# Patient Record
Sex: MALE | Race: OTHER | NOT HISPANIC OR LATINO
[De-identification: names, ages, dates, MRNs, and addresses within clinical notes are randomized per-mention and may not be internally consistent; named-entity substitution may affect disease eponyms.]

---

## 2017-02-23 ENCOUNTER — APPOINTMENT (OUTPATIENT)
Dept: ULTRASOUND IMAGING | Facility: HOSPITAL | Age: 37
End: 2017-02-23

## 2017-02-23 ENCOUNTER — OUTPATIENT (OUTPATIENT)
Dept: OUTPATIENT SERVICES | Facility: HOSPITAL | Age: 37
LOS: 1 days | End: 2017-02-23
Payer: COMMERCIAL

## 2017-02-23 PROCEDURE — 93971 EXTREMITY STUDY: CPT

## 2017-06-09 ENCOUNTER — OUTPATIENT (OUTPATIENT)
Dept: OUTPATIENT SERVICES | Facility: HOSPITAL | Age: 37
LOS: 1 days | End: 2017-06-09
Payer: COMMERCIAL

## 2017-06-09 ENCOUNTER — APPOINTMENT (OUTPATIENT)
Age: 37
End: 2017-06-09

## 2017-06-09 DIAGNOSIS — M54.16 RADICULOPATHY, LUMBAR REGION: ICD-10-CM

## 2017-06-09 PROCEDURE — 64483 NJX AA&/STRD TFRM EPI L/S 1: CPT

## 2019-02-25 ENCOUNTER — APPOINTMENT (OUTPATIENT)
Dept: ORTHOPEDIC SURGERY | Facility: CLINIC | Age: 39
End: 2019-02-25
Payer: COMMERCIAL

## 2019-02-25 VITALS
WEIGHT: 170 LBS | BODY MASS INDEX: 28.32 KG/M2 | HEART RATE: 111 BPM | SYSTOLIC BLOOD PRESSURE: 146 MMHG | HEIGHT: 65 IN | DIASTOLIC BLOOD PRESSURE: 92 MMHG

## 2019-02-25 DIAGNOSIS — Z78.9 OTHER SPECIFIED HEALTH STATUS: ICD-10-CM

## 2019-02-25 DIAGNOSIS — Z80.3 FAMILY HISTORY OF MALIGNANT NEOPLASM OF BREAST: ICD-10-CM

## 2019-02-25 DIAGNOSIS — M25.561 PAIN IN RIGHT KNEE: ICD-10-CM

## 2019-02-25 PROCEDURE — 73564 X-RAY EXAM KNEE 4 OR MORE: CPT | Mod: RT

## 2019-02-25 PROCEDURE — 99204 OFFICE O/P NEW MOD 45 MIN: CPT

## 2020-12-09 ENCOUNTER — APPOINTMENT (OUTPATIENT)
Dept: PSYCHIATRY | Facility: CLINIC | Age: 40
End: 2020-12-09
Payer: COMMERCIAL

## 2020-12-09 PROCEDURE — 90792 PSYCH DIAG EVAL W/MED SRVCS: CPT

## 2020-12-09 PROCEDURE — 99072 ADDL SUPL MATRL&STAF TM PHE: CPT

## 2021-02-14 ENCOUNTER — TRANSCRIPTION ENCOUNTER (OUTPATIENT)
Age: 41
End: 2021-02-14

## 2021-02-18 ENCOUNTER — APPOINTMENT (OUTPATIENT)
Dept: PSYCHIATRY | Facility: CLINIC | Age: 41
End: 2021-02-18

## 2021-04-20 ENCOUNTER — RX RENEWAL (OUTPATIENT)
Age: 41
End: 2021-04-20

## 2021-05-24 ENCOUNTER — APPOINTMENT (OUTPATIENT)
Dept: PSYCHIATRY | Facility: CLINIC | Age: 41
End: 2021-05-24
Payer: COMMERCIAL

## 2021-05-24 PROCEDURE — 99214 OFFICE O/P EST MOD 30 MIN: CPT

## 2021-06-17 ENCOUNTER — EMERGENCY (EMERGENCY)
Facility: HOSPITAL | Age: 41
LOS: 1 days | Discharge: HOME | End: 2021-06-17
Attending: EMERGENCY MEDICINE | Admitting: EMERGENCY MEDICINE
Payer: SELF-PAY

## 2021-06-17 VITALS
WEIGHT: 175.05 LBS | OXYGEN SATURATION: 99 % | HEIGHT: 66 IN | TEMPERATURE: 98 F | RESPIRATION RATE: 18 BRPM | HEART RATE: 79 BPM | DIASTOLIC BLOOD PRESSURE: 86 MMHG | SYSTOLIC BLOOD PRESSURE: 123 MMHG

## 2021-06-17 PROCEDURE — 99284 EMERGENCY DEPT VISIT MOD MDM: CPT

## 2021-06-17 PROCEDURE — 99283 EMERGENCY DEPT VISIT LOW MDM: CPT | Mod: 25

## 2021-06-17 PROCEDURE — 96372 THER/PROPH/DIAG INJ SC/IM: CPT

## 2021-06-17 RX ORDER — LIDOCAINE 4 G/100G
1 CREAM TOPICAL
Qty: 7 | Refills: 0
Start: 2021-06-17 | End: 2021-06-23

## 2021-06-17 RX ORDER — KETOROLAC TROMETHAMINE 30 MG/ML
30 SYRINGE (ML) INJECTION ONCE
Refills: 0 | Status: DISCONTINUED | OUTPATIENT
Start: 2021-06-17 | End: 2021-06-17

## 2021-06-17 RX ORDER — OXYCODONE AND ACETAMINOPHEN 5; 325 MG/1; MG/1
1 TABLET ORAL
Qty: 16 | Refills: 0
Start: 2021-06-17 | End: 2021-06-20

## 2021-06-17 RX ORDER — OXYCODONE AND ACETAMINOPHEN 5; 325 MG/1; MG/1
1 TABLET ORAL ONCE
Refills: 0 | Status: DISCONTINUED | OUTPATIENT
Start: 2021-06-17 | End: 2021-06-17

## 2021-06-17 RX ORDER — METHOCARBAMOL 500 MG/1
1 TABLET, FILM COATED ORAL
Qty: 15 | Refills: 0
Start: 2021-06-17 | End: 2021-06-21

## 2021-06-17 RX ORDER — LIDOCAINE 4 G/100G
1 CREAM TOPICAL ONCE
Refills: 0 | Status: COMPLETED | OUTPATIENT
Start: 2021-06-17 | End: 2021-06-17

## 2021-06-17 RX ORDER — METHOCARBAMOL 500 MG/1
750 TABLET, FILM COATED ORAL ONCE
Refills: 0 | Status: COMPLETED | OUTPATIENT
Start: 2021-06-17 | End: 2021-06-17

## 2021-06-17 RX ADMIN — OXYCODONE AND ACETAMINOPHEN 1 TABLET(S): 5; 325 TABLET ORAL at 08:44

## 2021-06-17 RX ADMIN — Medication 30 MILLIGRAM(S): at 08:18

## 2021-06-17 RX ADMIN — Medication 30 MILLIGRAM(S): at 08:44

## 2021-06-17 RX ADMIN — METHOCARBAMOL 750 MILLIGRAM(S): 500 TABLET, FILM COATED ORAL at 08:18

## 2021-06-17 RX ADMIN — LIDOCAINE 1 PATCH: 4 CREAM TOPICAL at 08:18

## 2021-06-17 RX ADMIN — OXYCODONE AND ACETAMINOPHEN 1 TABLET(S): 5; 325 TABLET ORAL at 08:18

## 2021-06-17 NOTE — ED PROVIDER NOTE - CARE PROVIDER_API CALL
Renato Dunne)  Orthopaedic Sports Medicine; Orthopaedic Surgery  [address] [address]  Phone: [phone]  Fax: [phone]  Follow Up Time:

## 2021-06-17 NOTE — ED PROVIDER NOTE - PATIENT PORTAL LINK FT
You can access the FollowMyHealth Patient Portal offered by [facility] by registering at the following website: [email or web address]. By joining [employer]’s FollowMyHealth portal, you will also be able to view your health information using other applications (apps) compatible with our system.

## 2021-06-17 NOTE — ED PROVIDER NOTE - CLINICAL SUMMARY MEDICAL DECISION MAKING FREE TEXT BOX
41M c/o lower back pain. Pt denies trauma/fall. He has h/o sacroiliitis for which he was followed by pain management and given epidurals. It helped. He states that miraculously, the pain went away and hasn't bothered him for a very long time. Now, he reports pain to the same areas. He happened to have pain meds from a recent dental procedure that he took to help it but it didn't work. No trauma or heavy lifting reported. Ambulatory. No weakness or loss of sensation. There is some radiation down the posterior left leg. No incontinence or stool or urine.   Exam as stated. Plan for antiinflamm, muscl relaxant, and pain control prn. Pt will f/u outpt with ortho. Worsening, continued or ANY new concerning symptoms return to the emergency department.

## 2021-06-17 NOTE — ED PROVIDER NOTE - NSFOLLOWUPINSTRUCTIONS_ED_ALL_ED_FT
Back Pain    Back pain is very common in adults. The cause of back pain is rarely dangerous and the pain often gets better over time. The cause of your back pain may not be known and may include strain of muscles or ligaments, degeneration of the spinal disks, or arthritis. Occasionally the pain may radiate down your leg(s). Over-the-counter medicines to reduce pain and inflammation are often the most helpful. Stretching and remaining active frequently helps the healing process.     Please follow up with an orthopedist. We will recommend one for you.     Please take Ibuprofen (over the counter) 800mg every 8 hours with food as needed for pain (each tablet over the counter is 200mg)    Take Methocarbamol 750mg every 8 hours as needed (great for muscle tightness/relaxation)    Take Prednisone 40mg once each day for 7 days     If severe, take Percocet every 8 hours as needed for severe pain. No driving.       SEEK IMMEDIATE MEDICAL CARE IF YOU HAVE ANY OF THE FOLLOWING SYMPTOMS: bowel or bladder control problems, unusual weakness or numbness in your arms or legs, nausea or vomiting, abdominal pain, fever, dizziness/lightheadedness.

## 2021-06-17 NOTE — ED PROVIDER NOTE - OBJECTIVE STATEMENT
41M c/o lower back pain. Pt denies trauma/fall. He has h/o sacroiliitis for which he was followed by pain management and given epidurals. It helped. He states that miraculously, the pain went away and hasn't bothered him for a very long time. Now, he reports pain to the same areas. He happened to have pain meds from a recent dental procedure that he took to help it but it didn't work. No trauma or heavy lifting reported. Ambulatory. No weakness or loss of sensation. There is some radiation down the posterior left leg. No incontinence or stool or urine.

## 2021-06-18 PROBLEM — M54.9 DORSALGIA, UNSPECIFIED: Chronic | Status: ACTIVE | Noted: 2021-06-17

## 2021-06-24 ENCOUNTER — APPOINTMENT (OUTPATIENT)
Dept: ORTHOPEDIC SURGERY | Facility: CLINIC | Age: 41
End: 2021-06-24
Payer: COMMERCIAL

## 2021-06-24 VITALS — HEART RATE: 97 BPM | DIASTOLIC BLOOD PRESSURE: 79 MMHG | SYSTOLIC BLOOD PRESSURE: 124 MMHG

## 2021-06-24 LAB
BASOPHILS # BLD AUTO: 0.03 K/UL
BASOPHILS NFR BLD AUTO: 0.3 %
EOSINOPHIL # BLD AUTO: 0.06 K/UL
EOSINOPHIL NFR BLD AUTO: 0.6 %
HCT VFR BLD CALC: 44.1 %
HGB BLD-MCNC: 14.5 G/DL
IMM GRANULOCYTES NFR BLD AUTO: 1.8 %
LYMPHOCYTES # BLD AUTO: 2.19 K/UL
LYMPHOCYTES NFR BLD AUTO: 21.4 %
MAN DIFF?: NORMAL
MCHC RBC-ENTMCNC: 30.3 PG
MCHC RBC-ENTMCNC: 32.9 GM/DL
MCV RBC AUTO: 92.1 FL
MONOCYTES # BLD AUTO: 0.68 K/UL
MONOCYTES NFR BLD AUTO: 6.6 %
NEUTROPHILS # BLD AUTO: 7.1 K/UL
NEUTROPHILS NFR BLD AUTO: 69.3 %
PLATELET # BLD AUTO: 344 K/UL
RBC # BLD: 4.79 M/UL
RBC # FLD: 11.9 %
WBC # FLD AUTO: 10.24 K/UL

## 2021-06-24 PROCEDURE — 99214 OFFICE O/P EST MOD 30 MIN: CPT

## 2021-06-24 PROCEDURE — 72100 X-RAY EXAM L-S SPINE 2/3 VWS: CPT

## 2021-06-24 NOTE — HISTORY OF PRESENT ILLNESS
[de-identified] : Mr. WISEMAN  is a 41 year male  presenting to the office complaining of low back pain. He  presents to the office ambulating independently. Patient reports pain intermittently for years.  Denies injury or trauma to the area.  The patient describes the pain as a dull aching, and occasionally sharp pain localized to the lumbar region that is intermittent in nature.  His symptoms are exacerbated with strenuous activities, prolonged standing and sitting.  Pain is alleviated with rest. Patient denies radicular symptoms. Patient notes the worst pain is at night and will wake him up at times. Patient denies new weakness, numbness or paresthesia.  Patient denies bowel/bladder dysfunction, fevers, chills, weight loss, night pain, or night sweats. Patient notes he had ESTELLA injections in the past with minimal relief in symptoms. Patient is taking NSAIDs for pain relief with mild to moderate relief in symptoms. He presents today with a MRI from [facility]. Patient denies any other complaints at this time. \par

## 2021-06-24 NOTE — DISCUSSION/SUMMARY
[de-identified] : The underlying pathophysiology was reviewed in great detail with the patient as well as the various treatment options, including ice, analgesics, NSAIDs, Physical therapy, steroid injections, ESTELLA rheumatology referral. \par \par MRI of the lumbar spine from 2017 was reviewed and discussed in great detail today. \par \par A prescription was provided for a MRI of the lumbar spine to rule out HNP.\par \par A prescription for Physical Therapy was provided.\par \par  A prescription for lab work was provided to rule out an inflammatory disease process.\par \par a prescription was provided for Indocin, and Skelaxin today. \par \par Activity modifications and restrictions were discussed.\par \par FU 6 weeks. \par \par All questions were answered, all alternatives discussed and the patient is in complete agreement with that plan. Follow-up appointment as instructed. Any issues and the patient will call or come in sooner.

## 2021-06-24 NOTE — CONSULT LETTER
[Dear  ___] : Dear  [unfilled], [Consult Letter:] : I had the pleasure of evaluating your patient, [unfilled]. [Please see my note below.] : Please see my note below. [Consult Closing:] : Thank you very much for allowing me to participate in the care of this patient.  If you have any questions, please do not hesitate to contact me. [Sincerely,] : Sincerely, [FreeTextEntry3] : Dr. Renato Dunne \par \par

## 2021-06-24 NOTE — PHYSICAL EXAM
[de-identified] : Lumbosacral Spine:\par Musculoskeletal Examination\par ¦ Inspection : no visible rash, no deformities\par ¦ Palpation : L mid lumbar spine tenderness to palpation, left SI joint tenderness to palpation and Left > right paraspinal musculature tenderness to palpation with palpable tightness. \par ¦ Stability : no subluxations present\par ¦ Range of Motion : full and painful arc of motion in all planes\par ¦ Muscle Strength : paraspinal muscle strength and tone within normal limits\par ¦ Muscle Tone : paraspinal muscle strength and tone within normal limits\par ¦ Tests/Signs : Straight Leg Raise Test (-) bilaterally. Patellar and Achilles Reflexes (2+) bilaterally.\par ¦ Lower Extremity Muscle Strength : hip flexion 5/5, knee flexion 5/5, ankle dorsiflexion 5/5, plantar flexion 5/5, EHL 5/5\par \par   [de-identified] : o Lumbosacral Spine : AP and lateral views were obtained, there are no soft tissue abnormalities, no fractures, Grade I spondylolisthesis L5/S1,  normal appearing disc spaces and foraminae, normal bone density, no bony lesions.\par \par \par Patient comes to today's visit with outside imaging already performed. I reviewed the images in detail with the patient and discussed the findings as highlighted below.\par \par o MRI of the lumbar spine performed at [facility] on 4/30/2017\par ¦ There is mild straightening of the normal lumbar lordosis. Vertebral bodies maintain normal height and signal. Intervertebral disc spaces are unremarkable with normal height and signal. The distal cord and conus are within normal limits. The paravertebral soft tissues are unremarkable.\par \par ¦ EVALUATION OF INDIVIDUAL LEVELS DEMONSTRATES: \par \par L1-2: No disc herniation spinal canal stenosis or neural foraminal narrowing.\par \par L2-3: No disc herniation spinal canal stenosis or neural foraminal narrowing.\par \par L3-4: No disc herniation spinal canal stenosis or neural foraminal narrowing.\par \par L4-5: There is a cystic structure seen along the inferior posterior aspect of the right facet joint compatible with extraspinal synovial cyst. There is mild facet hypertrophy at this level. No focal disc herniations are seen. No focal disc herniation spinal canal stenosis or significant foraminal stenosis.\par \par L5-S1: No disc herniation spinal canal stenosis or neural foraminal narrowing\par  \par \par Impression: \par \par Mild straightening of lumbar lordosis. Mild facet hypertrophy L4-5 level with extraspinal synovial cyst seen on right. \par \par

## 2021-06-25 ENCOUNTER — NON-APPOINTMENT (OUTPATIENT)
Age: 41
End: 2021-06-25

## 2021-06-25 LAB
CRP SERPL-MCNC: <3 MG/L
ERYTHROCYTE [SEDIMENTATION RATE] IN BLOOD BY WESTERGREN METHOD: 9 MM/HR
RHEUMATOID FACT SER QL: <10 IU/ML

## 2021-06-28 ENCOUNTER — NON-APPOINTMENT (OUTPATIENT)
Age: 41
End: 2021-06-28

## 2021-06-28 LAB
ANA SER IF-ACNC: NEGATIVE
B BURGDOR AB SER-IMP: NEGATIVE
B BURGDOR IGM PATRN SER IB-IMP: NEGATIVE
B BURGDOR18KD IGG SER QL IB: NORMAL
B BURGDOR23KD IGG SER QL IB: NORMAL
B BURGDOR23KD IGM SER QL IB: NORMAL
B BURGDOR28KD IGG SER QL IB: NORMAL
B BURGDOR30KD IGG SER QL IB: PRESENT
B BURGDOR31KD IGG SER QL IB: NORMAL
B BURGDOR39KD IGG SER QL IB: NORMAL
B BURGDOR39KD IGM SER QL IB: NORMAL
B BURGDOR41KD IGG SER QL IB: PRESENT
B BURGDOR41KD IGM SER QL IB: PRESENT
B BURGDOR45KD IGG SER QL IB: NORMAL
B BURGDOR58KD IGG SER QL IB: NORMAL
B BURGDOR66KD IGG SER QL IB: PRESENT
B BURGDOR93KD IGG SER QL IB: NORMAL

## 2021-07-05 ENCOUNTER — APPOINTMENT (OUTPATIENT)
Dept: MRI IMAGING | Facility: HOSPITAL | Age: 41
End: 2021-07-05
Payer: COMMERCIAL

## 2021-07-05 ENCOUNTER — OUTPATIENT (OUTPATIENT)
Dept: OUTPATIENT SERVICES | Facility: HOSPITAL | Age: 41
LOS: 1 days | End: 2021-07-05
Payer: COMMERCIAL

## 2021-07-05 DIAGNOSIS — M43.17 SPONDYLOLISTHESIS, LUMBOSACRAL REGION: ICD-10-CM

## 2021-07-05 PROCEDURE — 72148 MRI LUMBAR SPINE W/O DYE: CPT | Mod: 26

## 2021-07-05 PROCEDURE — 72148 MRI LUMBAR SPINE W/O DYE: CPT

## 2021-07-06 ENCOUNTER — TRANSCRIPTION ENCOUNTER (OUTPATIENT)
Age: 41
End: 2021-07-06

## 2021-07-07 ENCOUNTER — NON-APPOINTMENT (OUTPATIENT)
Age: 41
End: 2021-07-07

## 2021-07-07 LAB — HLA-B27 RELATED AG QL: NEGATIVE

## 2021-07-09 ENCOUNTER — NON-APPOINTMENT (OUTPATIENT)
Age: 41
End: 2021-07-09

## 2021-07-16 ENCOUNTER — RX RENEWAL (OUTPATIENT)
Age: 41
End: 2021-07-16

## 2021-07-27 ENCOUNTER — TRANSCRIPTION ENCOUNTER (OUTPATIENT)
Age: 41
End: 2021-07-27

## 2021-07-27 ENCOUNTER — RX RENEWAL (OUTPATIENT)
Age: 41
End: 2021-07-27

## 2021-08-09 ENCOUNTER — APPOINTMENT (OUTPATIENT)
Dept: ORTHOPEDIC SURGERY | Facility: CLINIC | Age: 41
End: 2021-08-09
Payer: COMMERCIAL

## 2021-08-09 DIAGNOSIS — M43.17 SPONDYLOLISTHESIS, LUMBOSACRAL REGION: ICD-10-CM

## 2021-08-09 PROCEDURE — 99214 OFFICE O/P EST MOD 30 MIN: CPT | Mod: NC

## 2021-08-09 NOTE — DISCUSSION/SUMMARY
[de-identified] : The underlying pathophysiology was reviewed in great detail with the patient as well as the various treatment options, including ice, analgesics, NSAIDs, Physical therapy, steroid injections, ESTELLA rheumatology referral. \par \par MRI of the lumbar spine and inflammatory labs were reviewed and discussed in great detail today. \par \par A home exercise sheet was given and discussed with the patient to follow. \par \par A prescription was provided for a Medrol Dose Fam. Patient is to adhere to instructions provided with medication. \par \par A prescription was provided for Gabapentin 100 mg tid. Discussed taking medication 1x a day at bedtime to start. He may titrate up to 100 mg 3x a day. \par \par Activity modifications and restrictions were discussed.\par \par Discussed trial of acupuncture. \par \par FU 6 weeks. \par \par All questions were answered, all alternatives discussed and the patient is in complete agreement with that plan. Follow-up appointment as instructed. Any issues and the patient will call or come in sooner.

## 2021-08-09 NOTE — HISTORY OF PRESENT ILLNESS
[de-identified] : Mr. WISEMAN  is a 41 year male  presenting to the office complaining of low back pain. He presents to the office ambulating independently. Patient reports pain intermittently for years.  Denies injury or trauma to the area.  The patient describes the pain as a dull aching, and occasionally sharp pain localized to the lumbar region that is intermittent in nature.  His symptoms are exacerbated with strenuous activities, prolonged standing and sitting.  Pain is alleviated with rest. Patient denies radicular symptoms. Patient notes the worst pain is at night and will wake him up at times. He notes these symptoms have increased since last visit. Patient denies new weakness, numbness or paresthesia.  Patient denies bowel/bladder dysfunction, fevers, chills, weight loss, night pain, or night sweats. Patient notes he had ESTELLA/ and trigger point injections in the past with minimal relief in symptoms. He notes he has had SI joint injections as well he believes they provided him some good temporary relief. Patient is taking NSAIDs for pain relief with mild to moderate relief in symptoms. He presents today with a MRI from [facility]. Patient was sent for inflammatory labs at his last visit that were WNL.  Patient denies any other complaints at this time. \par

## 2021-08-09 NOTE — PHYSICAL EXAM
[de-identified] : Lumbosacral Spine:\par Musculoskeletal Examination\par ¦ Inspection : no visible rash, no deformities\par ¦ Palpation : L mid lumbar spine tenderness to palpation, marked left SI joint tenderness to palpation and Left > right paraspinal musculature tenderness to palpation with palpable tightness. \par ¦ Stability : no subluxations present\par ¦ Range of Motion : full and painful arc of motion in all planes\par ¦ Muscle Strength : paraspinal muscle strength and tone within normal limits\par ¦ Muscle Tone : paraspinal muscle strength and tone within normal limits\par ¦ Tests/Signs : Straight Leg Raise Test (-) bilaterally. Patellar and Achilles Reflexes (2+) bilaterally.\par ¦ Lower Extremity Muscle Strength : hip flexion 5/5, knee flexion 5/5, ankle dorsiflexion 5/5, plantar flexion 5/5, EHL 5/5\par \par  Right Lower Extremity\par o Hip :\par ¦ Inspection/Palpation : no tenderness, no swelling, no deformity\par ¦ Range of Motion : full and painless in all planes, no crepitus\par ¦ Stability : joint stability intact\par ¦ Strength : hip flexion 5/5\par ¦ Tests and Signs : all tests for stability normal\par o Muscle Tone : tone normal\par o Muscle Bulk : normal muscle bulk present\par o Skin : no erythema, no ecchymosis\par o Sensation : sensation to light touch intact\par o Vascular Exam : no edema, no cyanosis, dorsalis pedis artery pulse 2+, posterior tibial artery pulse 2+\par \par Left Lower Extremity\par o Hip :\par ¦ Inspection/Palpation : no tenderness, no swelling, no deformity\par ¦ Range of Motion : full and painless in all planes, no crepitus\par ¦ Stability : joint stability intact\par ¦ Strength : hip flexion 5/5\par ¦ Tests and Signs : all tests for stability normal\par o Muscle Tone : tone normal\par o Muscle Bulk : normal muscle bulk present\par o Skin : no erythema, no ecchymosis\par o Sensation : sensation to light touch intact\par o Vascular Exam : no edema, no cyanosis, dorsalis pedis artery pulse 2+, posterior tibial artery pulse 2+\par  [de-identified] : Patient comes to today's visit with outside imaging already performed. I reviewed the images in detail with the patient and discussed the findings as highlighted below.\par \par o MRI of the lumbar spine performed at [facility] on 07/05/2021\par \par FINDINGS:\par \par OSSEOUS STRUCTURES\par  Fractures: None.\par  Alignment: Maintained.\par  Marrow Signal: Maintained.\par \par SPINAL CORD\par  Signal: Normal.\par  Conus Medullaris: Terminates at L1.\par \par DISC LEVELS\par T12-L1: Maintained on the sagittal sequences.\par \par L1-L2: Maintained.\par \par L2-L3: Maintained.\par \par L3-L4: Maintained.\par \par L4-L5: Disc is maintained. Small facet cyst is again seen posteriorly on the right. This is mildly increased in size over time.\par \par L5-S1: Mild loss of disc height is present posteriorly without stenosis.\par \par VISUALIZED SACROILIAC JOINTS\par Maintained.\par \par SOFT TISSUES\par Unremarkable.\par \par IMPRESSION:\par 1. Lumbar discs are maintained.\par 2. Small posterior facet cyst is again seen on the right at L4-L5 that is mildly increased in size over time.

## 2021-08-11 ENCOUNTER — APPOINTMENT (OUTPATIENT)
Dept: PSYCHIATRY | Facility: CLINIC | Age: 41
End: 2021-08-11
Payer: COMMERCIAL

## 2021-08-11 DIAGNOSIS — G89.29 SACROCOCCYGEAL DISORDERS, NOT ELSEWHERE CLASSIFIED: ICD-10-CM

## 2021-08-11 DIAGNOSIS — M53.3 SACROCOCCYGEAL DISORDERS, NOT ELSEWHERE CLASSIFIED: ICD-10-CM

## 2021-08-11 PROCEDURE — 99214 OFFICE O/P EST MOD 30 MIN: CPT

## 2021-08-11 RX ORDER — ZOLPIDEM TARTRATE 5 MG/1
5 TABLET ORAL
Qty: 15 | Refills: 1 | Status: DISCONTINUED | COMMUNITY
Start: 2020-12-09 | End: 2021-08-11

## 2021-08-13 ENCOUNTER — TRANSCRIPTION ENCOUNTER (OUTPATIENT)
Age: 41
End: 2021-08-13

## 2021-11-08 ENCOUNTER — APPOINTMENT (OUTPATIENT)
Dept: PSYCHIATRY | Facility: CLINIC | Age: 41
End: 2021-11-08
Payer: COMMERCIAL

## 2021-11-08 PROCEDURE — 99214 OFFICE O/P EST MOD 30 MIN: CPT

## 2021-11-08 RX ORDER — GABAPENTIN 100 MG/1
100 CAPSULE ORAL 3 TIMES DAILY
Qty: 90 | Refills: 0 | Status: DISCONTINUED | COMMUNITY
Start: 2021-08-09 | End: 2021-11-08

## 2021-11-18 ENCOUNTER — NON-APPOINTMENT (OUTPATIENT)
Age: 41
End: 2021-11-18

## 2021-11-18 ENCOUNTER — APPOINTMENT (OUTPATIENT)
Dept: INTERNAL MEDICINE | Facility: CLINIC | Age: 41
End: 2021-11-18
Payer: COMMERCIAL

## 2021-11-18 VITALS
RESPIRATION RATE: 17 BRPM | WEIGHT: 185 LBS | DIASTOLIC BLOOD PRESSURE: 78 MMHG | BODY MASS INDEX: 30.82 KG/M2 | SYSTOLIC BLOOD PRESSURE: 116 MMHG | HEIGHT: 65 IN | TEMPERATURE: 97.7 F

## 2021-11-18 DIAGNOSIS — L30.9 DERMATITIS, UNSPECIFIED: ICD-10-CM

## 2021-11-18 DIAGNOSIS — Z83.3 FAMILY HISTORY OF DIABETES MELLITUS: ICD-10-CM

## 2021-11-18 PROCEDURE — G0444 DEPRESSION SCREEN ANNUAL: CPT

## 2021-11-18 PROCEDURE — 99204 OFFICE O/P NEW MOD 45 MIN: CPT | Mod: 25

## 2021-11-18 PROCEDURE — G0442 ANNUAL ALCOHOL SCREEN 15 MIN: CPT

## 2021-11-18 RX ORDER — CLOBETASOL PROPIONATE 0.5 MG/G
0.05 CREAM TOPICAL
Qty: 3 | Refills: 3 | Status: ACTIVE | COMMUNITY
Start: 2021-11-18 | End: 1900-01-01

## 2021-11-18 RX ORDER — OXYCODONE 5 MG/1
5 TABLET ORAL
Refills: 0 | Status: DISCONTINUED | COMMUNITY
Start: 2021-06-25 | End: 2021-11-18

## 2021-11-18 NOTE — HEALTH RISK ASSESSMENT
[Very Good] : ~his/her~  mood as very good [Yes] : Yes [Monthly or less (1 pt)] : Monthly or less (1 point) [1 or 2 (0 pts)] : 1 or 2 (0 points) [Never (0 pts)] : Never (0 points) [No] : In the past 12 months have you used drugs other than those required for medical reasons? No [No falls in past year] : Patient reported no falls in the past year [0] : 2) Feeling down, depressed, or hopeless: Not at all (0) [PHQ-2 Negative - No further assessment needed] : PHQ-2 Negative - No further assessment needed [HIV test declined] : HIV test declined [Hepatitis C test declined] : Hepatitis C test declined [None] : None [Employed] : employed [] :  [Sexually Active] : sexually active [Feels Safe at Home] : Feels safe at home [Fully functional (bathing, dressing, toileting, transferring, walking, feeding)] : Fully functional (bathing, dressing, toileting, transferring, walking, feeding) [Fully functional (using the telephone, shopping, preparing meals, housekeeping, doing laundry, using] : Fully functional and needs no help or supervision to perform IADLs (using the telephone, shopping, preparing meals, housekeeping, doing laundry, using transportation, managing medications and managing finances) [] : No [Audit-CScore] : 1 [de-identified] : not active [de-identified] : could be better [[id number]] : 0 [Change in mental status noted] : No change in mental status noted [Language] : denies difficulty with language [Learning/Retaining New Information] : denies difficulty learning/retaining new information [Handling Complex Tasks] : denies difficulty handling complex tasks [Reasoning] : denies difficulty with reasoning [Spatial Ability and Orientation] : denies difficulty with spatial ability and orientation [Reports changes in hearing] : Reports no changes in hearing [Reports changes in vision] : Reports no changes in vision [Reports changes in dental health] : Reports no changes in dental health

## 2021-11-18 NOTE — HISTORY OF PRESENT ILLNESS
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

## 2021-12-06 ENCOUNTER — APPOINTMENT (OUTPATIENT)
Dept: PSYCHIATRY | Facility: CLINIC | Age: 41
End: 2021-12-06
Payer: COMMERCIAL

## 2021-12-06 PROCEDURE — 99214 OFFICE O/P EST MOD 30 MIN: CPT

## 2021-12-08 LAB
BASOPHILS # BLD AUTO: 0.06 K/UL
BASOPHILS NFR BLD AUTO: 0.6 %
EOSINOPHIL # BLD AUTO: 0.23 K/UL
EOSINOPHIL NFR BLD AUTO: 2.4 %
ERYTHROCYTE [SEDIMENTATION RATE] IN BLOOD BY WESTERGREN METHOD: 20 MM/HR
HCT VFR BLD CALC: 45.9 %
HGB BLD-MCNC: 15.1 G/DL
IMM GRANULOCYTES NFR BLD AUTO: 0.8 %
LYMPHOCYTES # BLD AUTO: 2.87 K/UL
LYMPHOCYTES NFR BLD AUTO: 30.5 %
MAN DIFF?: NORMAL
MCHC RBC-ENTMCNC: 29.7 PG
MCHC RBC-ENTMCNC: 32.9 GM/DL
MCV RBC AUTO: 90.2 FL
MONOCYTES # BLD AUTO: 0.76 K/UL
MONOCYTES NFR BLD AUTO: 8.1 %
NEUTROPHILS # BLD AUTO: 5.42 K/UL
NEUTROPHILS NFR BLD AUTO: 57.6 %
PLATELET # BLD AUTO: 391 K/UL
RBC # BLD: 5.09 M/UL
RBC # FLD: 12.1 %
WBC # FLD AUTO: 9.42 K/UL

## 2021-12-09 ENCOUNTER — TRANSCRIPTION ENCOUNTER (OUTPATIENT)
Age: 41
End: 2021-12-09

## 2021-12-09 LAB
25(OH)D3 SERPL-MCNC: 18.1 NG/ML
ALBUMIN SERPL ELPH-MCNC: 4.6 G/DL
ALP BLD-CCNC: 57 U/L
ALT SERPL-CCNC: 18 U/L
ANION GAP SERPL CALC-SCNC: 10 MMOL/L
APPEARANCE: CLEAR
AST SERPL-CCNC: 18 U/L
BACTERIA: NEGATIVE
BILIRUB SERPL-MCNC: 0.7 MG/DL
BILIRUBIN URINE: NEGATIVE
BLOOD URINE: NEGATIVE
BUN SERPL-MCNC: 12 MG/DL
CALCIUM SERPL-MCNC: 9.9 MG/DL
CHLORIDE SERPL-SCNC: 105 MMOL/L
CHOLEST SERPL-MCNC: 140 MG/DL
CO2 SERPL-SCNC: 28 MMOL/L
COLOR: YELLOW
CREAT SERPL-MCNC: 1.01 MG/DL
DEPRECATED D DIMER PPP IA-ACNC: <150 NG/ML DDU
ESTIMATED AVERAGE GLUCOSE: 114 MG/DL
FERRITIN SERPL-MCNC: 158 NG/ML
GLUCOSE QUALITATIVE U: NEGATIVE
GLUCOSE SERPL-MCNC: 103 MG/DL
HBA1C MFR BLD HPLC: 5.6 %
HDLC SERPL-MCNC: 45 MG/DL
HYALINE CASTS: 0 /LPF
KETONES URINE: NEGATIVE
LDLC SERPL CALC-MCNC: 75 MG/DL
LEUKOCYTE ESTERASE URINE: NEGATIVE
MICROSCOPIC-UA: NORMAL
NITRITE URINE: NEGATIVE
NONHDLC SERPL-MCNC: 95 MG/DL
PH URINE: 6
POTASSIUM SERPL-SCNC: 4.5 MMOL/L
PROT SERPL-MCNC: 7.2 G/DL
PROTEIN URINE: NEGATIVE
PSA SERPL-MCNC: 0.84 NG/ML
RED BLOOD CELLS URINE: 1 /HPF
SODIUM SERPL-SCNC: 143 MMOL/L
SPECIFIC GRAVITY URINE: 1.03
SQUAMOUS EPITHELIAL CELLS: 0 /HPF
TRIGL SERPL-MCNC: 100 MG/DL
TSH SERPL-ACNC: 0.31 UIU/ML
UROBILINOGEN URINE: ABNORMAL
WHITE BLOOD CELLS URINE: 0 /HPF

## 2021-12-10 ENCOUNTER — TRANSCRIPTION ENCOUNTER (OUTPATIENT)
Age: 41
End: 2021-12-10

## 2021-12-10 DIAGNOSIS — N52.9 MALE ERECTILE DYSFUNCTION, UNSPECIFIED: ICD-10-CM

## 2022-01-10 ENCOUNTER — APPOINTMENT (OUTPATIENT)
Dept: PSYCHIATRY | Facility: CLINIC | Age: 42
End: 2022-01-10
Payer: COMMERCIAL

## 2022-01-10 PROCEDURE — 99214 OFFICE O/P EST MOD 30 MIN: CPT | Mod: 95

## 2022-01-13 ENCOUNTER — TRANSCRIPTION ENCOUNTER (OUTPATIENT)
Age: 42
End: 2022-01-13

## 2022-01-14 ENCOUNTER — TRANSCRIPTION ENCOUNTER (OUTPATIENT)
Age: 42
End: 2022-01-14

## 2022-01-25 ENCOUNTER — RX RENEWAL (OUTPATIENT)
Age: 42
End: 2022-01-25

## 2022-01-25 RX ORDER — SERTRALINE HYDROCHLORIDE 50 MG/1
50 TABLET, FILM COATED ORAL
Qty: 30 | Refills: 0 | Status: DISCONTINUED | COMMUNITY
Start: 2020-12-09 | End: 2022-01-25

## 2022-02-10 ENCOUNTER — APPOINTMENT (OUTPATIENT)
Dept: PSYCHIATRY | Facility: CLINIC | Age: 42
End: 2022-02-10
Payer: COMMERCIAL

## 2022-02-10 PROCEDURE — 99213 OFFICE O/P EST LOW 20 MIN: CPT | Mod: 95

## 2022-02-10 RX ORDER — BUPROPION HYDROCHLORIDE 300 MG/1
300 TABLET, EXTENDED RELEASE ORAL
Qty: 90 | Refills: 0 | Status: DISCONTINUED | COMMUNITY
Start: 2021-11-08 | End: 2022-02-10

## 2022-05-02 ENCOUNTER — NON-APPOINTMENT (OUTPATIENT)
Age: 42
End: 2022-05-02

## 2022-05-09 ENCOUNTER — APPOINTMENT (OUTPATIENT)
Dept: INTERNAL MEDICINE | Facility: CLINIC | Age: 42
End: 2022-05-09

## 2022-05-14 ENCOUNTER — EMERGENCY (EMERGENCY)
Facility: HOSPITAL | Age: 42
LOS: 1 days | Discharge: HOME | End: 2022-05-14
Attending: EMERGENCY MEDICINE | Admitting: EMERGENCY MEDICINE
Payer: COMMERCIAL

## 2022-05-14 VITALS
SYSTOLIC BLOOD PRESSURE: 128 MMHG | WEIGHT: 193.57 LBS | HEART RATE: 83 BPM | DIASTOLIC BLOOD PRESSURE: 88 MMHG | OXYGEN SATURATION: 96 % | HEIGHT: 66 IN | RESPIRATION RATE: 16 BRPM | TEMPERATURE: 98 F

## 2022-05-14 PROCEDURE — 99284 EMERGENCY DEPT VISIT MOD MDM: CPT

## 2022-05-14 RX ORDER — METHOCARBAMOL 500 MG/1
750 TABLET, FILM COATED ORAL ONCE
Refills: 0 | Status: COMPLETED | OUTPATIENT
Start: 2022-05-14 | End: 2022-05-14

## 2022-05-14 RX ORDER — LIDOCAINE 4 G/100G
1 CREAM TOPICAL ONCE
Refills: 0 | Status: COMPLETED | OUTPATIENT
Start: 2022-05-14 | End: 2022-05-14

## 2022-05-14 RX ORDER — OXYCODONE AND ACETAMINOPHEN 5; 325 MG/1; MG/1
1 TABLET ORAL ONCE
Refills: 0 | Status: DISCONTINUED | OUTPATIENT
Start: 2022-05-14 | End: 2022-05-14

## 2022-05-14 RX ORDER — KETOROLAC TROMETHAMINE 30 MG/ML
30 SYRINGE (ML) INJECTION ONCE
Refills: 0 | Status: DISCONTINUED | OUTPATIENT
Start: 2022-05-14 | End: 2022-05-14

## 2022-05-14 RX ADMIN — METHOCARBAMOL 750 MILLIGRAM(S): 500 TABLET, FILM COATED ORAL at 23:24

## 2022-05-14 RX ADMIN — LIDOCAINE 1 PATCH: 4 CREAM TOPICAL at 23:25

## 2022-05-14 RX ADMIN — Medication 30 MILLIGRAM(S): at 23:24

## 2022-05-14 RX ADMIN — OXYCODONE AND ACETAMINOPHEN 1 TABLET(S): 5; 325 TABLET ORAL at 23:24

## 2022-05-14 NOTE — ED ADULT NURSE NOTE - OBJECTIVE STATEMENT
Patient presents to ED with complaint of lower back pain x several days. Alert and oriented x 4. No signs or symptoms of nausea, vomiting, numbness, dizziness or SOB.

## 2022-05-14 NOTE — ED ADULT TRIAGE NOTE - ARRIVAL INFO ADDITIONAL COMMENTS
Patient presents to ED with 10/10 back pain for one week, worsening the past 3-4 days. Patient with chronic back pain and imaging completed in past, history of DJD. Patient has tried Advil and Gabapentin with little to no affect at home. No numbness or tingling on lower extremities. Pain is 10/10 midline, denies spasms. No incontinence of urine or stool

## 2022-05-14 NOTE — ED ADULT TRIAGE NOTE - STATUS:
Radiotherapy Treatment Summary              PATIENT: Kayleigh Rocha  MEDICAL RECORD NO: [id number]   : 1962    DIAGNOSIS: Esophageal cancer  INTENT OF RADIOTHERAPY: Curative   PATHOLOGY:  Moderately differentiated squamous cell carcinoma                                  STAGE: cT3N1  CONCURRENT SYSTEMIC THERAPY: Carbo-Taxol        ONCOLOGIC HISTORY:  Ms. Rocha is a 58 year old female with a history of smoking and a known diagnosis of locally advanced oral cavity cancer, status post surgery followed by chemoradiation in 2017 and has since been in remission. 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 She as staged as [id number], stage III. She completed chemoradiation with plan for surgical evaluation thereafter.  The patient's course was complicated by treatment emergent PEG placement for nutritional support and admission to the hospital towards the end of treatment for pain management and poor nutrition/ hydration.     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  She received risk adaptive adjuvant chemoradiation to the tumor bed and bilateral necks was was treated to a total dose 6600 cGy in 33 fractions with concurrent cisplatin. This was done in our [facility], and was completed on 17. She has since been followed with clinic visits and repeat imaging studies, and there has been no evidence of disease recurrence.     On 1/10/21, the patient presented to the ED at an outside hospital with acute epigastric pain, vomiting, and inability to tolerate oral intake. A CT C/A/P was obtained as part of workup, which revealed a 4.1 cm  Applied segment of focal thickening in the upper esophagus, worrisome for malignancy. Endoscopic evaluations were performed on 1/11/21 and 1/13/21. Significant findings included a large, fungating, partially circumferential and partially obstructing mass in the middle third of the esophagus, first encountered at 27 cm from the incisors and extended to 32 cm. There was sonographic evidence suggestive of disease invasion into the adventitia. Two peritumoral nodes, 10 mm in maximal dimension, and a well-defined 14 x 6 mm left upper paratracheal node were also noted. Biopsy from the primary esophageal mass returned as moderately differentiated squamous cell carcinoma. FNA of the lymph nodes were negative. She underwent esophageal dilitation and stent placement later on the same day. Staging PET/CT on 1/18/21 re-demonstrated the hypermetabolic esophageal mass. There was also a moderately hypermetabolic soft tissue nodule/lymph node in the left mediastinum along the left paraesophageal region in the inferiolateral aspect of the primary, which may be a local lymph node metastasis.      She discussed her treatment options with Dr. Hidalgo of Medical Oncology on 1/22/21 and has seen Dr. Bingham of Radiation Oncology at the [facility]. The patient is now referred to us as the [facility] is closer to home for her now.       SITE OF TREATMENT: Esophagus-Thorax     DATES  OF TREATMENT: 2/23/21 to 4/16/21    TOTAL DOSE OF TREATMENT: 5000 cGy in 25 fractions    DOSE PER FRACTION OF TREATMENT: 200 cGy       COMMENT/TOXICITY:   Grade 3 esophagitis                   PAIN MANAGEMENT:                           Morphine 45mg BID  Oxycodone oral pill PRN.      FOLLOW UP PLAN:  1. RTC in 2 weeks for acute toxicity check.   2. Follow up with thoracic surgery team to evaluate for surgical candidacy.  CC  Patient Care Team:  Anca Augustin MD as PCP - General (Family Medicine)  Scooter Hughes MD as MD (Otolaryngology)  Alexander Jasso  MD MYCHAL as MD (Otolaryngology)  Alysia Kaiser MD as MD (Otolaryngology)  Augusta Landeros, RN as Registered Nurse (Nurse)  Rogelio Hidalgo MD as MD (Hematology)  Kay Fox RN as Clinic Care Coordinator (Oncology)  Manav Sargent MD as Assigned Cancer Care Provider  Marcel Platt RN as Specialty Care Coordinator (Oncology)  Manav Sargent MD as MD (Radiation Oncology)  Leonardo Whitaker MD as Assigned Surgical Provider       Manav Sargent M.D.  Department of Radiation Oncology  [facility]

## 2022-05-14 NOTE — ED ADULT NURSE NOTE - NSSUHOSCREENINGYN_ED_ALL_ED
Yes - the patient is able to be screened Ear Star Wedge Flap Text: The defect edges were debeveled with a #15 blade scalpel.  Given the location of the defect and the proximity to free margins (helical rim) an ear star wedge flap was deemed most appropriate.  Using a sterile surgical marker, the appropriate flap was drawn incorporating the defect and placing the expected incisions between the helical rim and antihelix where possible.  The area thus outlined was incised through and through with a #15 scalpel blade.

## 2022-05-14 NOTE — ED ADULT NURSE NOTE - NS ED NOTE  TALK SOMEONE YN
gait training/strengthening/balance training/transfer training/stair training/bed mobility training No

## 2022-05-15 PROCEDURE — 99284 EMERGENCY DEPT VISIT MOD MDM: CPT | Mod: 25

## 2022-05-15 PROCEDURE — 96372 THER/PROPH/DIAG INJ SC/IM: CPT

## 2022-05-15 RX ORDER — LIDOCAINE 4 G/100G
1 CREAM TOPICAL
Qty: 30 | Refills: 0
Start: 2022-05-15 | End: 2022-06-13

## 2022-05-15 RX ORDER — DIAZEPAM 5 MG
5 TABLET ORAL ONCE
Refills: 0 | Status: DISCONTINUED | OUTPATIENT
Start: 2022-05-15 | End: 2022-05-15

## 2022-05-15 RX ORDER — MORPHINE SULFATE 50 MG/1
4 CAPSULE, EXTENDED RELEASE ORAL ONCE
Refills: 0 | Status: DISCONTINUED | OUTPATIENT
Start: 2022-05-15 | End: 2022-05-15

## 2022-05-15 RX ORDER — METHOCARBAMOL 500 MG/1
1 TABLET, FILM COATED ORAL
Qty: 15 | Refills: 0
Start: 2022-05-15 | End: 2022-05-19

## 2022-05-15 RX ORDER — IBUPROFEN 200 MG
1 TABLET ORAL
Qty: 20 | Refills: 0
Start: 2022-05-15 | End: 2022-05-19

## 2022-05-15 RX ADMIN — MORPHINE SULFATE 4 MILLIGRAM(S): 50 CAPSULE, EXTENDED RELEASE ORAL at 00:11

## 2022-05-15 RX ADMIN — Medication 5 MILLIGRAM(S): at 00:11

## 2022-05-15 NOTE — ED PROVIDER NOTE - MUSCULOSKELETAL, MLM
Spine appears normal, range of motion is limited in hip flexion, tender lower lumbar paralumbar b/l areas. No step off. No rash. ROM of the lower extremities full and normal. Distal sensation intact.

## 2022-05-15 NOTE — ED PROVIDER NOTE - OBJECTIVE STATEMENT
42M presents to the ED c/o back pain. Pt denies trauma/fall. He has h/o sacroiliitis and he had been followed by pain management but his pain only comes on this strongly at least once per year. Now, he reports pain to the same areas. He had ibuprofen for the pain at one point but it didn't work. No trauma or heavy lifting reported. Has been in pain x 4 days. Ambulatory. No weakness or loss of sensation. There is some radiation down the posterior left leg but minimal. No incontinence or stool or urine. No fever. No abd pain. No chest pain.

## 2022-05-15 NOTE — ED PROVIDER NOTE - CLINICAL SUMMARY MEDICAL DECISION MAKING FREE TEXT BOX
42M presents to the ED c/o back pain. Pt denies trauma/fall. He has h/o sacroiliitis and he had been followed by pain management but his pain only comes on this strongly at least once per year. Now, he reports pain to the same areas. He had ibuprofen for the pain at one point but it didn't work. No trauma or heavy lifting reported. Has been in pain x 4 days. Ambulatory. No weakness or loss of sensation. There is some radiation down the posterior left leg but minimal. No incontinence or stool or urine. No fever. No abd pain. No chest pain.   Exam as stated. Plan for pain control. Reassess.   Worsening, continued or ANY new concerning symptoms return to the emergency department. 42M presents to the ED c/o back pain. Pt denies trauma/fall. He has h/o sacroiliitis and he had been followed by pain management but his pain only comes on this strongly at least once per year. Now, he reports pain to the same areas. He had ibuprofen for the pain at one point but it didn't work. No trauma or heavy lifting reported. Has been in pain x 4 days. Ambulatory. No weakness or loss of sensation. There is some radiation down the posterior left leg but minimal. No incontinence or stool or urine. No fever. No abd pain. No chest pain.   Exam as stated. Plan for pain control. Reassess.   Pt moving around better. Sleeping. Will allow to rest until safe to drive home.  Patient signed out to incoming physician.  All decisions regarding the progression of care will be made at their discretion.   Worsening, continued or ANY new concerning symptoms return to the emergency department.

## 2022-05-15 NOTE — ED PROVIDER NOTE - PROGRESS NOTE DETAILS
Irene Rogre MD, Attending  Pt reassessed, awake, pain much improved. Irene Roger MD, Attending  pt received at signout pending reassessment for pain, mental status, and ability to drive after receiving medications. Pt awoke spontaneously, states he feels much better, has not slept in 3 days due to pain. Pt offered more time to rest in the ED, but strongly prefers to go home. Pt got out of bed and ambulated to desk briskly without assistance, is alert, awake and well appearing. Pt agrees to follow up with orthopedist, but agrees to have SW follow up to facilitate ortho appointment. sticker in binder.

## 2022-05-15 NOTE — ED PROVIDER NOTE - NSFOLLOWUPINSTRUCTIONS_ED_ALL_ED_FT
Back Pain    Back pain is very common in adults. The cause of back pain is rarely dangerous and the pain often gets better over time. The cause of your back pain may not be known and may include strain of muscles or ligaments, degeneration of the spinal disks, or arthritis. Occasionally the pain may radiate down your leg(s). Over-the-counter medicines to reduce pain and inflammation are often the most helpful. Stretching and remaining active frequently helps the healing process.     SEEK IMMEDIATE MEDICAL CARE IF YOU HAVE ANY OF THE FOLLOWING SYMPTOMS: bowel or bladder control problems, unusual weakness or numbness in your arms or legs, nausea or vomiting, abdominal pain, fever, dizziness/lightheadedness. Back Pain    Back pain is very common in adults. The cause of back pain is rarely dangerous and the pain often gets better over time. The cause of your back pain may not be known and may include strain of muscles or ligaments, degeneration of the spinal disks, or arthritis. Occasionally the pain may radiate down your leg(s). Over-the-counter medicines to reduce pain and inflammation are often the most helpful. Stretching and remaining active frequently helps the healing process.    Please follow up with a pain management doctor or orthopedist.      SEEK IMMEDIATE MEDICAL CARE IF YOU HAVE ANY OF THE FOLLOWING SYMPTOMS: bowel or bladder control problems, unusual weakness or numbness in your arms or legs, nausea or vomiting, abdominal pain, fever, dizziness/lightheadedness.

## 2022-05-15 NOTE — ED PROVIDER NOTE - PATIENT PORTAL LINK FT
You can access the FollowMyHealth Patient Portal offered by [facility] by registering at the following website: [email or web address]. By joining [employer]’s FollowMyHealth portal, you will also be able to view your health information using other applications (apps) compatible with our system.

## 2022-05-19 ENCOUNTER — APPOINTMENT (OUTPATIENT)
Dept: ORTHOPEDIC SURGERY | Facility: CLINIC | Age: 42
End: 2022-05-19
Payer: COMMERCIAL

## 2022-05-19 VITALS — WEIGHT: 180 LBS | BODY MASS INDEX: 29.99 KG/M2 | HEIGHT: 65 IN

## 2022-05-19 DIAGNOSIS — M54.50 LOW BACK PAIN, UNSPECIFIED: ICD-10-CM

## 2022-05-19 PROCEDURE — 99214 OFFICE O/P EST MOD 30 MIN: CPT

## 2022-05-21 NOTE — PHYSICAL EXAM
[LE] : Sensory: Intact in bilateral lower extremities [Knee] : patellar 2+ and symmetric bilaterally [Ankle] : ankle 2+ and symmetric bilaterally [Normal RLE] : Right Lower Extremity: No scars, rashes, lesions, ulcers, skin intact [Normal LLE] : Left Lower Extremity: No scars, rashes, lesions, ulcers, skin intact [Normal DTR Reflexes] : DTR reflexes normal [Normal Touch] : sensation intact for touch [Normal] : No costovertebral angle tenderness and no spinal tenderness [Poor Appearance] : well-appearing [Acute Distress] : not in acute distress [Obese] : not obese [de-identified] : Heel and toe walk is intact\par Negative tension sign [de-identified] : \par EXAM: MR SPINE LUMBAR\par PROCEDURE DATE: 07/05/2021\par INTERPRETATION: MR LUMBAR SPINE\par HISTORY: Pain. 2 months duration.\par TECHNIQUE: Multiplanar MRI of the lumbar spine was performed without contrast using 6 sequences.\par COMPARISON: MRI lumbar spine dated April 20, 2016\par FINDINGS:\par \par OSSEOUS STRUCTURES\par  Fractures: None.\par  Alignment: Maintained.\par  Marrow Signal: Maintained.\par \par SPINAL CORD\par  Signal: Normal.\par  Conus Medullaris: Terminates at L1.\par DISC [id number]: Maintained on the sagittal sequences.\par L1-L2: Maintained.\par L2-L3: Maintained.\par L3-L4: Maintained.\par L4-L5: Disc is maintained. Small facet cyst is again seen posteriorly on the right. This is mildly increased in size over time.\par L5-S1: Mild loss of disc height is present posteriorly without stenosis.\par VISUALIZED SACROILIAC JOINTS\par Maintained.\par SOFT TISSUES\par Unremarkable.\par \par IMPRESSION:\par 1. Lumbar discs are maintained.\par 2. Small posterior facet cyst is again seen on the right at L4-L5 that is mildly increased in size over time.\par \par --- End of Report ---\par \par

## 2022-05-21 NOTE — HISTORY OF PRESENT ILLNESS
[Pain Location] : pain [Lumbar] : lumbar region [Worsening] : worsening [___ wks] : [unfilled] week(s) ago [None] : No relieving factors are noted [de-identified] : Patient is a 43 yo male with complaints of low pack pain for several years but this past episode last week was so intense that patient went to the [place] ER with no xrays obtained. Patient does have intermittent right leg radiculopathy that radiates into the right buttock. No buckling, no trauma is recalled by the patient. \par Patient takes NSAIDs with minimal and temporary relief from the medication\par Patient in the remote past, was treated with physical therapy and pain management with [facility].\par Patient is unable to sleep secondary to the lower back pain.

## 2022-05-21 NOTE — DISCUSSION/SUMMARY
[de-identified] : The underlying pathophysiology was reviewed in great detail with the patient as well as the various treatment options, including ice, analgesics, NSAIDS, Physical therapy, steroid injections.\par \par Medrol dose pack and muscle relaxer prescriptions provided on today's office visit\par New MRI of the lumbar spine will be ordered for a further evaluation and new radicular symptoms into the right buttock\par \par A prescription was given for physical therapy.\par F/u after MRI

## 2022-06-02 ENCOUNTER — APPOINTMENT (OUTPATIENT)
Dept: MRI IMAGING | Facility: HOSPITAL | Age: 42
End: 2022-06-02

## 2022-06-23 ENCOUNTER — APPOINTMENT (OUTPATIENT)
Dept: PSYCHIATRY | Facility: CLINIC | Age: 42
End: 2022-06-23
Payer: COMMERCIAL

## 2022-06-23 PROCEDURE — 99214 OFFICE O/P EST MOD 30 MIN: CPT

## 2022-06-23 RX ORDER — ATOMOXETINE 40 MG/1
40 CAPSULE ORAL
Qty: 30 | Refills: 0 | Status: DISCONTINUED | COMMUNITY
Start: 2022-02-10 | End: 2022-06-23

## 2022-07-28 ENCOUNTER — APPOINTMENT (OUTPATIENT)
Dept: PSYCHIATRY | Facility: CLINIC | Age: 42
End: 2022-07-28

## 2022-07-28 PROCEDURE — 99213 OFFICE O/P EST LOW 20 MIN: CPT

## 2022-07-28 RX ORDER — ZOLPIDEM TARTRATE 5 MG/1
5 TABLET ORAL
Qty: 15 | Refills: 1 | Status: DISCONTINUED | COMMUNITY
Start: 2021-12-06 | End: 2022-07-28

## 2022-08-29 ENCOUNTER — RX RENEWAL (OUTPATIENT)
Age: 42
End: 2022-08-29

## 2022-09-01 ENCOUNTER — APPOINTMENT (OUTPATIENT)
Dept: INTERNAL MEDICINE | Facility: CLINIC | Age: 42
End: 2022-09-01

## 2022-09-01 VITALS
SYSTOLIC BLOOD PRESSURE: 120 MMHG | RESPIRATION RATE: 17 BRPM | TEMPERATURE: 97 F | HEART RATE: 88 BPM | BODY MASS INDEX: 29.99 KG/M2 | OXYGEN SATURATION: 98 % | HEIGHT: 65 IN | WEIGHT: 180 LBS | DIASTOLIC BLOOD PRESSURE: 80 MMHG

## 2022-09-01 VITALS
TEMPERATURE: 97.1 F | RESPIRATION RATE: 17 BRPM | BODY MASS INDEX: 29.99 KG/M2 | HEART RATE: 88 BPM | DIASTOLIC BLOOD PRESSURE: 80 MMHG | HEIGHT: 65 IN | WEIGHT: 180 LBS | SYSTOLIC BLOOD PRESSURE: 120 MMHG | OXYGEN SATURATION: 98 %

## 2022-09-01 DIAGNOSIS — E78.5 HYPERLIPIDEMIA, UNSPECIFIED: ICD-10-CM

## 2022-09-01 DIAGNOSIS — Z80.0 FAMILY HISTORY OF MALIGNANT NEOPLASM OF DIGESTIVE ORGANS: ICD-10-CM

## 2022-09-01 PROCEDURE — 99214 OFFICE O/P EST MOD 30 MIN: CPT

## 2022-09-01 NOTE — HISTORY OF PRESENT ILLNESS
[FreeTextEntry1] : concern about CA [de-identified] : Pt has concerns as mother has metastatic pancreatic CA (had breast and another CA as well)\par \par

## 2022-09-15 ENCOUNTER — APPOINTMENT (OUTPATIENT)
Dept: PSYCHIATRY | Facility: CLINIC | Age: 42
End: 2022-09-15

## 2022-09-15 PROCEDURE — 99214 OFFICE O/P EST MOD 30 MIN: CPT

## 2022-09-23 ENCOUNTER — TRANSCRIPTION ENCOUNTER (OUTPATIENT)
Age: 42
End: 2022-09-23

## 2022-09-23 LAB
25(OH)D3 SERPL-MCNC: 26.9 NG/ML
ALBUMIN SERPL ELPH-MCNC: 4.9 G/DL
ALP BLD-CCNC: 71 U/L
ALT SERPL-CCNC: 27 U/L
ANION GAP SERPL CALC-SCNC: 19 MMOL/L
APPEARANCE: ABNORMAL
AST SERPL-CCNC: 21 U/L
BACTERIA: NEGATIVE
BASOPHILS # BLD AUTO: 0.03 K/UL
BASOPHILS NFR BLD AUTO: 0.3 %
BILIRUB SERPL-MCNC: 0.4 MG/DL
BILIRUBIN URINE: NEGATIVE
BLOOD URINE: NEGATIVE
BUN SERPL-MCNC: 13 MG/DL
C PNEUM IGG SER QL: NORMAL TITER
C PNEUM IGM SER QL: NORMAL TITER
C PSITTACI IGG SER QL: NORMAL TITER
C PSITTACI IGM SER QL: NORMAL TITER
C TRACH B IGG SER QL: NORMAL TITER
C TRACH B IGM SER QL: NORMAL TITER
C TRACH RRNA SPEC QL NAA+PROBE: NOT DETECTED
CALCIUM SERPL-MCNC: 10.6 MG/DL
CANCER AG19-9 SERPL-ACNC: 5 U/ML
CHLORIDE SERPL-SCNC: 106 MMOL/L
CHOLEST SERPL-MCNC: 157 MG/DL
CO2 SERPL-SCNC: 26 MMOL/L
COLOR: YELLOW
CREAT SERPL-MCNC: 0.93 MG/DL
CRP SERPL HS-MCNC: 1.34 MG/L
EGFR: 104 ML/MIN/1.73M2
EOSINOPHIL # BLD AUTO: 0.26 K/UL
EOSINOPHIL NFR BLD AUTO: 2.7 %
ESTIMATED AVERAGE GLUCOSE: 117 MG/DL
GLUCOSE QUALITATIVE U: NEGATIVE
GLUCOSE SERPL-MCNC: 67 MG/DL
HAV IGM SER QL: NONREACTIVE
HBA1C MFR BLD HPLC: 5.7 %
HBV CORE IGM SER QL: NONREACTIVE
HBV SURFACE AG SER QL: NONREACTIVE
HCT VFR BLD CALC: 49.1 %
HCV AB SER QL: NONREACTIVE
HCV S/CO RATIO: 0.11 S/CO
HDLC SERPL-MCNC: 41 MG/DL
HGB BLD-MCNC: 15.9 G/DL
HIV1+2 AB SPEC QL IA.RAPID: NONREACTIVE
HSV 1+2 IGG SER IA-IMP: NEGATIVE
HSV 1+2 IGG SER IA-IMP: POSITIVE
HSV1 IGG SER QL: 35.1 INDEX
HSV1 IGM SER QL: NEGATIVE
HSV2 AB FLD-ACNC: NEGATIVE
HSV2 IGG SER QL: 0.06 INDEX
HYALINE CASTS: 1 /LPF
IMM GRANULOCYTES NFR BLD AUTO: 0.3 %
KETONES URINE: NEGATIVE
LDLC SERPL CALC-MCNC: 81 MG/DL
LEUKOCYTE ESTERASE URINE: NEGATIVE
LYMPHOCYTES # BLD AUTO: 3.38 K/UL
LYMPHOCYTES NFR BLD AUTO: 35.4 %
MAN DIFF?: NORMAL
MCHC RBC-ENTMCNC: 30.8 PG
MCHC RBC-ENTMCNC: 32.4 GM/DL
MCV RBC AUTO: 95 FL
MICROSCOPIC-UA: NORMAL
MONOCYTES # BLD AUTO: 0.72 K/UL
MONOCYTES NFR BLD AUTO: 7.5 %
N GONORRHOEA RRNA SPEC QL NAA+PROBE: NOT DETECTED
NEUTROPHILS # BLD AUTO: 5.14 K/UL
NEUTROPHILS NFR BLD AUTO: 53.8 %
NITRITE URINE: NEGATIVE
NONHDLC SERPL-MCNC: 116 MG/DL
PH URINE: 7.5
PLATELET # BLD AUTO: 390 K/UL
POTASSIUM SERPL-SCNC: 5 MMOL/L
PROT SERPL-MCNC: 7.5 G/DL
PROTEIN URINE: ABNORMAL
PSA SERPL-MCNC: 0.67 NG/ML
RBC # BLD: 5.17 M/UL
RBC # FLD: 12.3 %
RED BLOOD CELLS URINE: 7 /HPF
SODIUM SERPL-SCNC: 151 MMOL/L
SOURCE AMPLIFICATION: NORMAL
SPECIFIC GRAVITY URINE: 1.03
SQUAMOUS EPITHELIAL CELLS: 1 /HPF
T PALLIDUM AB SER QL IA: NEGATIVE
TRIGL SERPL-MCNC: 171 MG/DL
TSH SERPL-ACNC: 0.41 UIU/ML
UROBILINOGEN URINE: NORMAL
WBC # FLD AUTO: 9.56 K/UL
WHITE BLOOD CELLS URINE: 1 /HPF

## 2022-10-05 ENCOUNTER — TRANSCRIPTION ENCOUNTER (OUTPATIENT)
Age: 42
End: 2022-10-05

## 2022-10-27 ENCOUNTER — RX RENEWAL (OUTPATIENT)
Age: 42
End: 2022-10-27

## 2022-11-23 ENCOUNTER — TRANSCRIPTION ENCOUNTER (OUTPATIENT)
Age: 42
End: 2022-11-23

## 2022-12-01 ENCOUNTER — APPOINTMENT (OUTPATIENT)
Dept: PSYCHIATRY | Facility: CLINIC | Age: 42
End: 2022-12-01

## 2023-01-19 ENCOUNTER — APPOINTMENT (OUTPATIENT)
Dept: PSYCHIATRY | Facility: CLINIC | Age: 43
End: 2023-01-19
Payer: COMMERCIAL

## 2023-01-19 PROCEDURE — 99213 OFFICE O/P EST LOW 20 MIN: CPT

## 2023-01-19 NOTE — RISK ASSESSMENT
[No, patient denies ideation or behavior] : No, patient denies ideation or behavior [Low acute suicide risk] : Low acute suicide risk [No] : No

## 2023-01-19 NOTE — RESULTS/DATA
[FreeTextEntry1] : Pt reports his blood work is pending, has lost close to 30 lbs, expect pre diabetes and hyperlipidemia to be wnl.

## 2023-01-19 NOTE — HISTORY OF PRESENT ILLNESS
[FreeTextEntry1] : Pt reports he is doing well. Pt reports he is working out, doing well at work. \par Pt is still working long hours. \par Pt has lost weight deliberately. \par Pt is taking some time for himself. Pt has chronically problems with sleep, and is pending a sleep study, can fall asleep but not \par stay asleep, not affected by Vyvanse. Pt reports even without the Vyvanse his sleep pattern is the same. \par Pt reports focus, attention, and concentration is good, pt without the meds feels his mind is scattered. Feels he is calm and in control, no new emotional issues.

## 2023-01-19 NOTE — PLAN
[No] : No [Medication education provided] : Medication education provided. [Rationale for medication choices, possible risks/precautions, benefits, alternative treatment choices, and consequences of non-treatment discussed] : Rationale for medication choices, possible risks/precautions, benefits, alternative treatment choices, and consequences of non-treatment discussed with patient/family/caregiver  [FreeTextEntry4] : Meeting treatment goals.  [FreeTextEntry5] : Continue current management.

## 2023-01-19 NOTE — PHYSICAL EXAM
[Well groomed] : well groomed [Cooperative] : cooperative [Euthymic] : euthymic [Full] : full [Clear] : clear [Linear/Goal Directed] : linear/goal directed [[place]] : concrete [None] : none [None Reported] : none reported [Average] : average [WNL] : within normal limits [Not applicable] : not applicable [FreeTextEntry5] : Pt looks more thin than last visit [FreeTextEntry1] : well groomed, well dressed, dressed appropriate for weather [FreeTextEntry8] : neutral, polite [de-identified] : normal pace, normal tone [FreeTextEntry7] : well organized [de-identified] : alert & oriented x3 [de-identified] : good judgment

## 2023-01-19 NOTE — DISCUSSION/SUMMARY
[FreeTextEntry1] : Pt med due on February 6th. 2023, depression in remission. ADHD sx treated with psychostimulant no over use , misuse or diversion. \par \par I stop checked. This report was requested by: Demi Abbott | Reference #: [id number]

## 2023-01-21 ENCOUNTER — RX RENEWAL (OUTPATIENT)
Age: 43
End: 2023-01-21

## 2023-02-01 ENCOUNTER — NON-APPOINTMENT (OUTPATIENT)
Age: 43
End: 2023-02-01

## 2023-02-09 ENCOUNTER — RX RENEWAL (OUTPATIENT)
Age: 43
End: 2023-02-09

## 2023-03-09 ENCOUNTER — APPOINTMENT (OUTPATIENT)
Dept: PSYCHIATRY | Facility: CLINIC | Age: 43
End: 2023-03-09
Payer: COMMERCIAL

## 2023-03-09 DIAGNOSIS — F51.02 ADJUSTMENT INSOMNIA: ICD-10-CM

## 2023-03-09 PROCEDURE — 99213 OFFICE O/P EST LOW 20 MIN: CPT

## 2023-03-09 NOTE — PHYSICAL EXAM
[Well groomed] : well groomed [Cooperative] : cooperative [Euthymic] : euthymic [Full] : full [Clear] : clear [Linear/Goal Directed] : linear/goal directed [[place]] : concrete [None] : none [None Reported] : none reported [Average] : average [WNL] : within normal limits [Not applicable] : not applicable [FreeTextEntry1] : well groomed, well dressed, dressed appropriate for weather has full sleeve on left arm.  [de-identified] : normal pace, normal tone [FreeTextEntry7] : well organized [de-identified] : alert & oriented x3 [de-identified] : good judgment

## 2023-03-09 NOTE — HISTORY OF PRESENT ILLNESS
[FreeTextEntry1] : Pt working with quality of life, and work life balance. \par Pt clinically stable. \par Per pt Melatonin has a paradoxical effect. Pt reports no new benefits from Hydroxyzine. \par Pt can fall asleep will wake at 1-2 am. \par Pt has not had a sleep study, was recommended to get a sleep study. \par  [FreeTextEntry2] : steroid use in med history was for back pain. Pt no longer on it.

## 2023-03-09 NOTE — DISCUSSION/SUMMARY
[FreeTextEntry1] : I stop checked. This report was requested by: Demi Abbott | Reference #: [id number]\par Will need meds done- 03/12/23. \par Switch Sertraline to daytime, follow up on sleep study. \par Start Sonata for middle insomnia prn, defer using Hydroxyzine for now. \par Continue Vyvanse. \par \par [place] sleepiness scale- score = 5.

## 2023-03-09 NOTE — PLAN
[Yes. details: ___] : Yes, [unfilled] [Medication education provided] : Medication education provided. [Rationale for medication choices, possible risks/precautions, benefits, alternative treatment choices, and consequences of non-treatment discussed] : Rationale for medication choices, possible risks/precautions, benefits, alternative treatment choices, and consequences of non-treatment discussed with patient/family/caregiver  [FreeTextEntry4] : Meeting treatment goals, will try Sildenafil for ED and add Pycnogenol  as adjunct may need to consider alternative medication.  [FreeTextEntry5] : as above

## 2023-03-11 ENCOUNTER — TRANSCRIPTION ENCOUNTER (OUTPATIENT)
Age: 43
End: 2023-03-11

## 2023-03-16 DIAGNOSIS — G47.30 SLEEP APNEA, UNSPECIFIED: ICD-10-CM

## 2023-03-23 ENCOUNTER — NON-APPOINTMENT (OUTPATIENT)
Age: 43
End: 2023-03-23

## 2023-05-15 ENCOUNTER — TRANSCRIPTION ENCOUNTER (OUTPATIENT)
Age: 43
End: 2023-05-15

## 2023-05-25 ENCOUNTER — APPOINTMENT (OUTPATIENT)
Dept: PSYCHIATRY | Facility: CLINIC | Age: 43
End: 2023-05-25
Payer: COMMERCIAL

## 2023-05-25 PROCEDURE — 99214 OFFICE O/P EST MOD 30 MIN: CPT

## 2023-05-25 NOTE — HISTORY OF PRESENT ILLNESS
[FreeTextEntry1] : Pt reports having a dog, and now has a Corgie. Pt reports he is doing well in his new job. \par Pt is working out, and still has busy work schedule. \par Pt states he did try Sonata for middle insomnia , which is on going , generally works well and does not take often. \par He has energy throughout the day, takes Vyvanse early and this helps adherence. \par Pt will continue current meds offers no other new complaints, pt reports he forgets Sertraline at times, in the last week he has taken it. \par Pt depression largely in remission, will taper med and then d/c and monitor off meds. ( will monitor off - anhedonia), may be non adherent due to sexual s/e.

## 2023-05-25 NOTE — PHYSICAL EXAM
[Well groomed] : well groomed [Cooperative] : cooperative [Euthymic] : euthymic [Full] : full [Clear] : clear [Linear/Goal Directed] : linear/goal directed [None] : none [None Reported] : none reported [Average] : average [WNL] : within normal limits [Not applicable] : not applicable [de-identified] : alert & oriented x3 [de-identified] : good judgment

## 2023-05-25 NOTE — PLAN
[No] : No [Medication education provided] : Medication education provided. [Rationale for medication choices, possible risks/precautions, benefits, alternative treatment choices, and consequences of non-treatment discussed] : Rationale for medication choices, possible risks/precautions, benefits, alternative treatment choices, and consequences of non-treatment discussed with patient/family/caregiver  [FreeTextEntry4] : Meeting treatment goals.  [FreeTextEntry5] : Pt compliant with meds, controlled sub reviewed. \par Plan to taper Sertraline and d/c monitor for return of anxiety and /or mood sx. \par Executive function good with Vyvanse at current dose, I stop checked.

## 2023-05-25 NOTE — DISCUSSION/SUMMARY
[FreeTextEntry1] : I stop checked. B	N	Y	S	05/04/2023	05/08/2023	vyvanse 30 mg capsule	30	30	Shaun Christianson	[id number]	Insurance	[facility] #[id number]\par \par Meds due June 5th 2023.\par \par  B	Y	N		03/09/2023	03/09/2023	zaleplon 5 mg capsule	10	10	Demi Abbott MD	[id number]	Insurance	[facility] #[id number] [Potential impact of patient’s physical health conditions on psychiatric care?] : Potential impact of patient’s physical health conditions on psychiatric care: No [Does patient require any additional health services or referrals?] : Does patient require any additional health services or referrals: No

## 2023-07-20 ENCOUNTER — APPOINTMENT (OUTPATIENT)
Dept: PSYCHIATRY | Facility: CLINIC | Age: 43
End: 2023-07-20
Payer: COMMERCIAL

## 2023-07-20 DIAGNOSIS — F32.1 MAJOR DEPRESSIVE DISORDER, SINGLE EPISODE, MODERATE: ICD-10-CM

## 2023-07-20 PROCEDURE — 99214 OFFICE O/P EST MOD 30 MIN: CPT

## 2023-07-20 RX ORDER — HYDROXYZINE HYDROCHLORIDE 25 MG/1
25 TABLET ORAL
Qty: 60 | Refills: 1 | Status: DISCONTINUED | COMMUNITY
Start: 2021-08-11 | End: 2023-07-20

## 2023-07-20 NOTE — PLAN
[No] : No [Medication education provided] : Medication education provided. [Rationale for medication choices, possible risks/precautions, benefits, alternative treatment choices, and consequences of non-treatment discussed] : Rationale for medication choices, possible risks/precautions, benefits, alternative treatment choices, and consequences of non-treatment discussed with patient/family/caregiver  [FreeTextEntry4] : Meeting treatment goals , tolerating medication well. Will continue current management. \par \par Does need to follow up on labs from last year- Triglycerides and HgA1C.  [FreeTextEntry5] :  I stop checked. \par \par A	Y	Y	S	07/14/2023	07/17/2023	vyvanse 30 mg capsule	30	30	Demi Abbott MD	[id number]	Insurance	[facility] #[id number]\par A	Y	N	S	06/13/2023	06/14/2023	vyvanse 30 mg capsule	30	30	Demi Abbott MD	[id number]	Insurance	[facility] #[id number]\par A	Y	N		05/25/2023	05/31/2023	zaleplon 5 mg capsule	21	21	Demi Abbott MD	[id number]	Insurance	[facility] #[id number] report was requested by: Demi Abbott | Reference #: [id number]

## 2023-07-20 NOTE — HISTORY OF PRESENT ILLNESS
[FreeTextEntry1] : Pt reports having a dog, and now has a Corgie. Pt reports he is doing well in his new job. \par Pt is working out, and still has busy work schedule. \par Pt states he did try Sonata for middle insomnia , which is on going , generally works well and does not take often. \par Pt reports he has been more adherent with Sertraline, denied any issues with compliance. \par Mood and anxiety has been good, no change in focus , attention, and has sweating as a side, is generally also very dressed for work.

## 2023-07-20 NOTE — PHYSICAL EXAM
[Well groomed] : well groomed [Cooperative] : cooperative [Euthymic] : euthymic [Full] : full [Clear] : clear [Linear/Goal Directed] : linear/goal directed [None] : none [None Reported] : none reported [Average] : average [WNL] : within normal limits [Not applicable] : not applicable [de-identified] : alert & oriented x3 [de-identified] : good judgment

## 2023-08-09 ENCOUNTER — RX RENEWAL (OUTPATIENT)
Age: 43
End: 2023-08-09

## 2023-08-09 RX ORDER — ATORVASTATIN CALCIUM 10 MG/1
10 TABLET, FILM COATED ORAL
Qty: 90 | Refills: 3 | Status: ACTIVE | COMMUNITY
Start: 2022-09-01 | End: 1900-01-01

## 2023-09-21 ENCOUNTER — APPOINTMENT (OUTPATIENT)
Dept: PSYCHIATRY | Facility: CLINIC | Age: 43
End: 2023-09-21
Payer: COMMERCIAL

## 2023-09-21 PROCEDURE — 99214 OFFICE O/P EST MOD 30 MIN: CPT

## 2023-10-05 ENCOUNTER — APPOINTMENT (OUTPATIENT)
Dept: INTERNAL MEDICINE | Facility: CLINIC | Age: 43
End: 2023-10-05
Payer: COMMERCIAL

## 2023-10-05 VITALS
SYSTOLIC BLOOD PRESSURE: 128 MMHG | WEIGHT: 173 LBS | HEART RATE: 88 BPM | DIASTOLIC BLOOD PRESSURE: 76 MMHG | OXYGEN SATURATION: 98 % | HEIGHT: 65 IN | TEMPERATURE: 97.34 F | BODY MASS INDEX: 28.82 KG/M2 | RESPIRATION RATE: 18 BRPM

## 2023-10-05 DIAGNOSIS — Z00.00 ENCOUNTER FOR GENERAL ADULT MEDICAL EXAMINATION W/OUT ABNORMAL FINDINGS: ICD-10-CM

## 2023-10-05 PROCEDURE — 99396 PREV VISIT EST AGE 40-64: CPT

## 2023-10-05 RX ORDER — METHYLPREDNISOLONE 4 MG/1
4 TABLET ORAL
Qty: 1 | Refills: 0 | Status: COMPLETED | COMMUNITY
Start: 2022-05-19 | End: 2023-10-05

## 2023-10-05 RX ORDER — PREDNISONE 20 MG/1
20 TABLET ORAL
Refills: 0 | Status: COMPLETED | COMMUNITY
Start: 2021-06-25 | End: 2023-10-05

## 2023-10-05 RX ORDER — CYCLOBENZAPRINE HYDROCHLORIDE 10 MG/1
10 TABLET, FILM COATED ORAL
Qty: 30 | Refills: 0 | Status: COMPLETED | COMMUNITY
Start: 2022-05-19 | End: 2023-10-05

## 2023-10-05 RX ORDER — METHYLPREDNISOLONE 4 MG/1
4 TABLET ORAL
Qty: 1 | Refills: 0 | Status: COMPLETED | COMMUNITY
Start: 2021-08-09 | End: 2023-10-05

## 2023-10-05 RX ORDER — INDOMETHACIN 75 MG/1
75 CAPSULE, EXTENDED RELEASE ORAL
Qty: 60 | Refills: 0 | Status: COMPLETED | COMMUNITY
Start: 2021-06-24 | End: 2023-10-05

## 2023-10-05 RX ORDER — METHOCARBAMOL 500 MG/1
500 TABLET, FILM COATED ORAL 3 TIMES DAILY
Qty: 42 | Refills: 0 | Status: COMPLETED | COMMUNITY
Start: 2021-06-24 | End: 2023-10-05

## 2023-12-07 ENCOUNTER — APPOINTMENT (OUTPATIENT)
Dept: PSYCHIATRY | Facility: CLINIC | Age: 43
End: 2023-12-07

## 2023-12-20 ENCOUNTER — APPOINTMENT (OUTPATIENT)
Dept: PSYCHIATRY | Facility: CLINIC | Age: 43
End: 2023-12-20
Payer: COMMERCIAL

## 2023-12-20 PROCEDURE — 99214 OFFICE O/P EST MOD 30 MIN: CPT

## 2023-12-20 RX ORDER — ZALEPLON 5 MG/1
5 CAPSULE ORAL
Qty: 21 | Refills: 1 | Status: ACTIVE | COMMUNITY
Start: 2023-03-09

## 2023-12-20 NOTE — DISCUSSION/SUMMARY
[FreeTextEntry1] : I stop checked.  A	Y	N	S	11/17/2023	11/18/2023	lisdexamfetamine 30 mg capsule	30	30	Demi Abbott MD	[id number]	Insurance	[facility] #[id number] A	Y	N		11/17/2023	11/18/2023	zaleplon 5 mg capsule	21	21	Demi Abbott MD	[id number]	Insurance	[facility] #[id number]

## 2023-12-20 NOTE — REVIEW OF SYSTEMS
[Negative] : Allergic/Immunologic [FreeTextEntry9] : pt reports that his back is hurting.  [de-identified] : see interval history.

## 2023-12-20 NOTE — PHYSICAL EXAM
[Well groomed] : well groomed [Cooperative] : cooperative [Euthymic] : euthymic [Full] : full [Clear] : clear [Linear/Goal Directed] : linear/goal directed [None] : none [None Reported] : none reported [Average] : average [WNL] : within normal limits [Not applicable] : not applicable [de-identified] : alert & oriented x3 [de-identified] : good judgment

## 2023-12-20 NOTE — PLAN
[No] : No [Medication education provided] : Medication education provided. [Rationale for medication choices, possible risks/precautions, benefits, alternative treatment choices, and consequences of non-treatment discussed] : Rationale for medication choices, possible risks/precautions, benefits, alternative treatment choices, and consequences of non-treatment discussed with patient/family/caregiver  [FreeTextEntry4] : Pt continues to struggle with middle insomnia, will wake at 3/4 am and the latest he can sleep is 7 pm.  CBT -i  referred to [facility] for that as well as local therapists.  [FreeTextEntry5] : Per pt he had a sleep study which was negative.  Per pt Melatonin keeps him up.  Can consider Ambien BLAYNE with next visit.

## 2023-12-20 NOTE — HISTORY OF PRESENT ILLNESS
[FreeTextEntry1] : Pt has enjoyed going to the gym. Aside from mild boredom. He had previously been in therapy.  Pt did not connect with last therapist, comes from a small family of origin, pt reports his friend group comments that he is negative and possibly complains. Per pt had gotten the feedback that he is "miserable about nothing."  Pt reports that he has found he is off in terms of executive functioning without Vyvanse, that he is more focused as well as recently without meds is more distracted and will attempt to use reminders on his phone.

## 2024-01-05 ENCOUNTER — NON-APPOINTMENT (OUTPATIENT)
Age: 44
End: 2024-01-05

## 2024-03-06 ENCOUNTER — NON-APPOINTMENT (OUTPATIENT)
Age: 44
End: 2024-03-06

## 2024-04-22 ENCOUNTER — APPOINTMENT (OUTPATIENT)
Dept: ORTHOPEDIC SURGERY | Facility: CLINIC | Age: 44
End: 2024-04-22
Payer: COMMERCIAL

## 2024-04-22 VITALS — BODY MASS INDEX: 28.82 KG/M2 | WEIGHT: 173 LBS | HEIGHT: 65 IN

## 2024-04-22 PROCEDURE — 73130 X-RAY EXAM OF HAND: CPT | Mod: LT

## 2024-04-22 PROCEDURE — 99203 OFFICE O/P NEW LOW 30 MIN: CPT

## 2024-04-22 RX ORDER — DICLOFENAC SODIUM 75 MG/1
75 TABLET, DELAYED RELEASE ORAL
Qty: 60 | Refills: 2 | Status: ACTIVE | COMMUNITY
Start: 2024-04-22 | End: 1900-01-01

## 2024-04-22 NOTE — PHYSICAL EXAM
[5th] : 5th [MCP Joint] : MCP joint [Left] : left hand [There are no fractures, subluxations or dislocations. No significant abnormalities are seen] : There are no fractures, subluxations or dislocations. No significant abnormalities are seen [] : negative triggering

## 2024-04-22 NOTE — ASSESSMENT
[FreeTextEntry1] : symptoms have improved; therefore will observe, if recurs he will call. May try glove when he weight lifts. Prescribed diclofenac for few days.  The patient was prescribed an anti- inflammatory medication at today's visit. The patient was advised that this medication should be taken with food as they can cause gastrointestinal upset. They patient is also aware that these medications may exacerbate any pre existing hypertension and they should speak with their medical doctor before starting the medication. They were advised to stop the medication if they experience any stomach upset or develop headaches or blurry vision.

## 2024-04-22 NOTE — REASON FOR VISIT
[FreeTextEntry2] : Patient is a 44 year old RHD male with complaints of left hand pain. Picked up heavy object 2 days ago, heard a popping sound left pinky. Pain radiating into left wrist started yesterday. Pain is worse in the morning. Took some advil. No c/o N/T or inability to use the hand.

## 2024-04-22 NOTE — HISTORY OF PRESENT ILLNESS
[7] : 7 [2] : 2 [Dull/Aching] : dull/aching [Sharp] : sharp [Shooting] : shooting [Household chores] : household chores [Leisure] : leisure [Work] : work [Rest] : rest [Meds] : meds [Ice] : ice [Retired] : Work status: retired [] : Post Surgical Visit: no [FreeTextEntry1] : lt hand  [FreeTextEntry7] : lt wrist [FreeTextEntry9] : Advil  [de-identified] : grasping objects

## 2024-04-29 DIAGNOSIS — S66.912A STRAIN OF UNSPECIFIED MUSCLE, FASCIA AND TENDON AT WRIST AND HAND LEVEL, LEFT HAND, INITIAL ENCOUNTER: ICD-10-CM

## 2024-05-02 ENCOUNTER — RESULT REVIEW (OUTPATIENT)
Age: 44
End: 2024-05-02

## 2024-05-02 ENCOUNTER — APPOINTMENT (OUTPATIENT)
Dept: MRI IMAGING | Facility: HOSPITAL | Age: 44
End: 2024-05-02
Payer: COMMERCIAL

## 2024-05-02 ENCOUNTER — OUTPATIENT (OUTPATIENT)
Dept: OUTPATIENT SERVICES | Facility: HOSPITAL | Age: 44
LOS: 1 days | End: 2024-05-02
Payer: COMMERCIAL

## 2024-05-02 DIAGNOSIS — S66.912A STRAIN OF UNSPECIFIED MUSCLE, FASCIA AND TENDON AT WRIST AND HAND LEVEL, LEFT HAND, INITIAL ENCOUNTER: ICD-10-CM

## 2024-05-02 PROCEDURE — 73218 MRI UPPER EXTREMITY W/O DYE: CPT | Mod: 26,LT

## 2024-05-02 PROCEDURE — 73218 MRI UPPER EXTREMITY W/O DYE: CPT

## 2024-05-13 ENCOUNTER — APPOINTMENT (OUTPATIENT)
Dept: ORTHOPEDIC SURGERY | Facility: CLINIC | Age: 44
End: 2024-05-13
Payer: COMMERCIAL

## 2024-05-13 VITALS — BODY MASS INDEX: 28.82 KG/M2 | HEIGHT: 65 IN | WEIGHT: 173 LBS

## 2024-05-13 DIAGNOSIS — G56.02 CARPAL TUNNEL SYNDROME, LEFT UPPER LIMB: ICD-10-CM

## 2024-05-13 PROCEDURE — 99214 OFFICE O/P EST MOD 30 MIN: CPT

## 2024-05-13 NOTE — PHYSICAL EXAM
[MCP Joint] : MCP joint [Left] : left hand [There are no fractures, subluxations or dislocations. No significant abnormalities are seen] : There are no fractures, subluxations or dislocations. No significant abnormalities are seen [] : good capillary refill in all fingers

## 2024-05-13 NOTE — ASSESSMENT
[FreeTextEntry1] : MRI results discussed, will refer to hand specialist and neurologist for EMGs. Recommend getting wrist brace to wear especially at night.

## 2024-05-13 NOTE — REASON FOR VISIT
[FreeTextEntry2] : Patient here to review MRI results left hand: fluid around the flexor tendons. Still c/o difficulty making a fist, numbness tips of MF, IF. Takes the diclofenac.

## 2024-05-13 NOTE — HISTORY OF PRESENT ILLNESS
[7] : 7 [2] : 2 [Dull/Aching] : dull/aching [Sharp] : sharp [Shooting] : shooting [Household chores] : household chores [Leisure] : leisure [Work] : work [Rest] : rest [Meds] : meds [Ice] : ice [Retired] : Work status: retired [] : Post Surgical Visit: no [FreeTextEntry1] : lt hand  [FreeTextEntry7] : lt wrist [FreeTextEntry9] : Advil  [de-identified] : grasping objects [de-identified] : 4/22/24 [de-identified] : Dr. Escalante

## 2024-05-23 ENCOUNTER — APPOINTMENT (OUTPATIENT)
Dept: PSYCHIATRY | Facility: CLINIC | Age: 44
End: 2024-05-23
Payer: COMMERCIAL

## 2024-05-23 DIAGNOSIS — F90.2 ATTENTION-DEFICIT HYPERACTIVITY DISORDER, COMBINED TYPE: ICD-10-CM

## 2024-05-23 DIAGNOSIS — F41.1 GENERALIZED ANXIETY DISORDER: ICD-10-CM

## 2024-05-23 PROCEDURE — 99214 OFFICE O/P EST MOD 30 MIN: CPT

## 2024-05-23 RX ORDER — SERTRALINE HYDROCHLORIDE 50 MG/1
50 TABLET, FILM COATED ORAL DAILY
Qty: 90 | Refills: 0 | Status: ACTIVE | COMMUNITY
Start: 2022-06-23 | End: 1900-01-01

## 2024-05-23 RX ORDER — SILDENAFIL 100 MG/1
100 TABLET, FILM COATED ORAL
Qty: 6 | Refills: 5 | Status: ACTIVE | COMMUNITY
Start: 2021-12-10 | End: 1900-01-01

## 2024-05-23 NOTE — PHYSICAL EXAM
[Well groomed] : well groomed [Cooperative] : cooperative [Euthymic] : euthymic [Full] : full [Clear] : clear [Linear/Goal Directed] : linear/goal directed [None] : none [None Reported] : none reported [Average] : average [WNL] : within normal limits [Not applicable] : not applicable [de-identified] : alert & oriented x3 [de-identified] : good judgment

## 2024-05-23 NOTE — HISTORY OF PRESENT ILLNESS
[FreeTextEntry1] : Pt reports brand name too expensive and generic not available.  Pt has mainly been in the Adderall formulation of medication which has helped executive function.  No change in sleep, energy, appetite, or mood.  Pt with mild sexual dysfunction with Sertraline, in terms of ED per pt Sildenafil citrate and medication in that family do help as well.  Pt depressive sx largely in remission, as is anxiety sx, pt struggling with executive functioning, attention span, and is fidgeting.

## 2024-05-23 NOTE — PLAN
[No] : No [Medication education provided] : Medication education provided. [Rationale for medication choices, possible risks/precautions, benefits, alternative treatment choices, and consequences of non-treatment discussed] : Rationale for medication choices, possible risks/precautions, benefits, alternative treatment choices, and consequences of non-treatment discussed with patient/family/caregiver  [FreeTextEntry4] : Meeting goals of care, but with exacerbation of ADD sx due to not being able to be on medications for the past few months, cost and availability factors.  [FreeTextEntry5] : Writer called local pharmacy- they have available Amphetamine-Dextroamphetamine, NO Lis dexamphetamine available.  Defer switching to Methylphenidate family of stimulant, has done Strattera in 2022.  Continue Sertraline Renew Sildenafil Pt aware medication may require a PA.

## 2024-07-01 ENCOUNTER — NON-APPOINTMENT (OUTPATIENT)
Age: 44
End: 2024-07-01

## 2024-07-11 ENCOUNTER — APPOINTMENT (OUTPATIENT)
Dept: PSYCHIATRY | Facility: CLINIC | Age: 44
End: 2024-07-11
Payer: COMMERCIAL

## 2024-07-11 DIAGNOSIS — F90.2 ATTENTION-DEFICIT HYPERACTIVITY DISORDER, COMBINED TYPE: ICD-10-CM

## 2024-07-11 DIAGNOSIS — F41.1 GENERALIZED ANXIETY DISORDER: ICD-10-CM

## 2024-07-11 PROCEDURE — 99214 OFFICE O/P EST MOD 30 MIN: CPT

## 2024-09-10 ENCOUNTER — NON-APPOINTMENT (OUTPATIENT)
Age: 44
End: 2024-09-10

## 2024-09-20 RX ORDER — DEXTROAMPHETAMINE SACCHARATE, AMPHETAMINE ASPARTATE, DEXTROAMPHETAMINE SULFATE AND AMPHETAMINE SULFATE 7.5; 7.5; 7.5; 7.5 MG/1; MG/1; MG/1; MG/1
30 TABLET ORAL DAILY
Qty: 30 | Refills: 0 | Status: ACTIVE | COMMUNITY
Start: 2024-09-20 | End: 1900-01-01

## 2024-10-10 ENCOUNTER — APPOINTMENT (OUTPATIENT)
Dept: PSYCHIATRY | Facility: CLINIC | Age: 44
End: 2024-10-10
Payer: COMMERCIAL

## 2024-10-10 DIAGNOSIS — F90.2 ATTENTION-DEFICIT HYPERACTIVITY DISORDER, COMBINED TYPE: ICD-10-CM

## 2024-10-10 PROCEDURE — 99213 OFFICE O/P EST LOW 20 MIN: CPT

## 2024-11-27 ENCOUNTER — NON-APPOINTMENT (OUTPATIENT)
Age: 44
End: 2024-11-27

## 2025-01-09 ENCOUNTER — APPOINTMENT (OUTPATIENT)
Dept: PSYCHIATRY | Facility: CLINIC | Age: 45
End: 2025-01-09
Payer: COMMERCIAL

## 2025-01-09 DIAGNOSIS — F51.02 ADJUSTMENT INSOMNIA: ICD-10-CM

## 2025-01-09 DIAGNOSIS — F90.2 ATTENTION-DEFICIT HYPERACTIVITY DISORDER, COMBINED TYPE: ICD-10-CM

## 2025-01-09 DIAGNOSIS — F41.1 GENERALIZED ANXIETY DISORDER: ICD-10-CM

## 2025-01-09 PROCEDURE — 99214 OFFICE O/P EST MOD 30 MIN: CPT

## 2025-07-10 ENCOUNTER — APPOINTMENT (OUTPATIENT)
Dept: PSYCHIATRY | Facility: CLINIC | Age: 45
End: 2025-07-10
Payer: COMMERCIAL

## 2025-07-10 PROCEDURE — 99213 OFFICE O/P EST LOW 20 MIN: CPT
